# Patient Record
Sex: FEMALE | Race: AMERICAN INDIAN OR ALASKA NATIVE
[De-identification: names, ages, dates, MRNs, and addresses within clinical notes are randomized per-mention and may not be internally consistent; named-entity substitution may affect disease eponyms.]

---

## 2019-01-26 ENCOUNTER — HOSPITAL ENCOUNTER (INPATIENT)
Dept: HOSPITAL 5 - ED | Age: 44
LOS: 6 days | Discharge: PSYCHIATRIC OR SUBSTANCE USE FACILITY | DRG: 917 | End: 2019-02-01
Attending: INTERNAL MEDICINE | Admitting: INTERNAL MEDICINE
Payer: MEDICAID

## 2019-01-26 DIAGNOSIS — K59.00: ICD-10-CM

## 2019-01-26 DIAGNOSIS — F31.9: ICD-10-CM

## 2019-01-26 DIAGNOSIS — G40.89: ICD-10-CM

## 2019-01-26 DIAGNOSIS — E66.9: ICD-10-CM
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
## 2019-01-26 DIAGNOSIS — F41.9: ICD-10-CM

## 2019-01-26 DIAGNOSIS — F43.10: ICD-10-CM
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
## 2019-01-26 DIAGNOSIS — Z90.710: ICD-10-CM

## 2019-01-26 DIAGNOSIS — K21.9: ICD-10-CM

## 2019-01-26 DIAGNOSIS — Z88.6: ICD-10-CM
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
## 2019-01-26 DIAGNOSIS — G89.4: ICD-10-CM

## 2019-01-26 DIAGNOSIS — I10: ICD-10-CM
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
## 2019-01-26 DIAGNOSIS — T50.991A: Primary | ICD-10-CM

## 2019-01-26 DIAGNOSIS — G92: ICD-10-CM

## 2019-01-26 LAB
ALBUMIN SERPL-MCNC: 3.7 G/DL (ref 3.9–5)
ALT SERPL-CCNC: 5 UNITS/L (ref 7–56)
BASOPHILS # (AUTO): 0.1 K/MM3 (ref 0–0.1)
BASOPHILS NFR BLD AUTO: 0.9 % (ref 0–1.8)
BUN SERPL-MCNC: 11 MG/DL (ref 7–17)
BUN/CREAT SERPL: 12 %
CALCIUM SERPL-MCNC: 8.4 MG/DL (ref 8.4–10.2)
EOSINOPHIL # BLD AUTO: 0.1 K/MM3 (ref 0–0.4)
EOSINOPHIL NFR BLD AUTO: 1.9 % (ref 0–4.3)
HCT VFR BLD CALC: 36.8 % (ref 30.3–42.9)
HEMOLYSIS INDEX: 19
HGB BLD-MCNC: 11.7 GM/DL (ref 10.1–14.3)
LYMPHOCYTES # BLD AUTO: 2.3 K/MM3 (ref 1.2–5.4)
LYMPHOCYTES NFR BLD AUTO: 36.4 % (ref 13.4–35)
MCHC RBC AUTO-ENTMCNC: 32 % (ref 30–34)
MCV RBC AUTO: 83 FL (ref 79–97)
MONOCYTES # (AUTO): 0.7 K/MM3 (ref 0–0.8)
MONOCYTES % (AUTO): 11.5 % (ref 0–7.3)
PLATELET # BLD: 205 K/MM3 (ref 140–440)
RBC # BLD AUTO: 4.41 M/MM3 (ref 3.65–5.03)

## 2019-01-26 PROCEDURE — 84703 CHORIONIC GONADOTROPIN ASSAY: CPT

## 2019-01-26 PROCEDURE — G0480 DRUG TEST DEF 1-7 CLASSES: HCPCS

## 2019-01-26 PROCEDURE — 84443 ASSAY THYROID STIM HORMONE: CPT

## 2019-01-26 PROCEDURE — 95819 EEG AWAKE AND ASLEEP: CPT

## 2019-01-26 PROCEDURE — 82550 ASSAY OF CK (CPK): CPT

## 2019-01-26 PROCEDURE — 85027 COMPLETE CBC AUTOMATED: CPT

## 2019-01-26 PROCEDURE — 74018 RADEX ABDOMEN 1 VIEW: CPT

## 2019-01-26 PROCEDURE — 93010 ELECTROCARDIOGRAM REPORT: CPT

## 2019-01-26 PROCEDURE — 36415 COLL VENOUS BLD VENIPUNCTURE: CPT

## 2019-01-26 PROCEDURE — 80164 ASSAY DIPROPYLACETIC ACD TOT: CPT

## 2019-01-26 PROCEDURE — 85025 COMPLETE CBC W/AUTO DIFF WBC: CPT

## 2019-01-26 PROCEDURE — 80053 COMPREHEN METABOLIC PANEL: CPT

## 2019-01-26 PROCEDURE — 81001 URINALYSIS AUTO W/SCOPE: CPT

## 2019-01-26 PROCEDURE — 93005 ELECTROCARDIOGRAM TRACING: CPT

## 2019-01-26 PROCEDURE — 80048 BASIC METABOLIC PNL TOTAL CA: CPT

## 2019-01-26 PROCEDURE — 70450 CT HEAD/BRAIN W/O DYE: CPT

## 2019-01-26 PROCEDURE — 84484 ASSAY OF TROPONIN QUANT: CPT

## 2019-01-26 PROCEDURE — 80320 DRUG SCREEN QUANTALCOHOLS: CPT

## 2019-01-26 PROCEDURE — 82150 ASSAY OF AMYLASE: CPT

## 2019-01-26 PROCEDURE — 83690 ASSAY OF LIPASE: CPT

## 2019-01-26 PROCEDURE — 82553 CREATINE MB FRACTION: CPT

## 2019-01-26 PROCEDURE — 80307 DRUG TEST PRSMV CHEM ANLYZR: CPT

## 2019-01-26 PROCEDURE — 82962 GLUCOSE BLOOD TEST: CPT

## 2019-01-26 NOTE — EMERGENCY DEPARTMENT REPORT
ED Altered Mental Status HPI





- General


Chief Complaint: Altered Mental Status


Stated Complaint: UNRESPONSIVE/POSSIBLE OD


Time Seen by Provider: 01/26/19 19:08


Source: patient


Mode of arrival: Ambulatory


Limitations: Altered Mental Status





- History of Present Illness


Initial Comments: 


She has a 43-year-old female that presents emergency room with complaints of 

decreased responsiveness and lethargy.  Patient was brought in by EMS.  

Patient's family called EMS since the patient was so tired and a decreased level

of responsiveness.  At this time patient is answering all questions 

appropriately but patient is lethargic but arousable.  Patient states that she 

took her psychiatric medications as directed.  She states she did not misuse any

of her medications.  Patient states not over take her medications and took all 

her meds as directed.  Patient states that her psychiatrist recently increased 

her mood stabilizer in her Seroquel.  Patient is unsure of the mood stabilizers 

name.  Patient states that she went up to 1-1/2 tablets of Seroquel and is 

making her tired.  Patient states she did not take extra medications.  Patient 

states she is taking all her medications as directed.  Patient states that the 

new changes to her Seroquel and mood stabilizer just too strong.  She denies 

headache.  Patient denies chest pain.  Patient denies shortness of breath.  She 

complains of being tired and fatigued.





MD Complaint: altered mental status, confusion, decreased responsiveness


-: Sudden


Severity: severe


Consistency of Symptoms: constant


Context: change in medication


Associated Symptoms: malaise, weakness.  denies: chest pain, cough, diaphoresis,

fever/chills, headaches, loss of appetite, nausea/vomiting, rash, seizure, 

shortness of breath, syncope, foul smelling urine, difficulty walking, diarrhea,

incontinence





- Related Data


                                Home Medications











 Medication  Instructions  Recorded  Confirmed  Last Taken


 


Calcium Carbonate [Calcium] 1 tab PO DAILY 06/12/14 11/22/14 11/21/14


 


Lisinopril 10 mg PO Q48HR 06/12/14 11/22/14 11/21/14


 


Multivitamin [Multi-Vitamin Daily] 1 tab PO DAILY 06/12/14 11/22/14 11/21/14


 


Cyclobenzaprine [Flexeril 10mg] 10 mg PO BID 11/22/14 11/22/14 11/21/14








                                  Previous Rx's











 Medication  Instructions  Recorded  Last Taken  Type


 


oxyCODONE /ACETAMINOPHEN [Percocet 1 - 2 tab PO Q4H PRN #30 tablet 06/20/14 11/ 21/14 Rx





5/325 mg]    


 


Acetaminophen/Codeine [Tylenol #3] 1 tab PO Q6H PRN #20 tab 06/02/16 Unknown Rx


 


Ibuprofen [Motrin] 600 mg PO Q8H PRN #40 tablet 06/02/16 Unknown Rx


 


Dicyclomine [Bentyl] 10 mg PO QID PRN #12 capsule 11/03/17 Unknown Rx


 


Lisinopril [Zestril TAB] 10 mg PO QDAY #30 tablet 11/03/17 Unknown Rx


 


Ondansetron [Zofran TAB] 4 mg PO Q8HR PRN #12 tablet 11/03/17 Unknown Rx











                                    Allergies











Allergy/AdvReac Type Severity Reaction Status Date / Time


 


tramadol Allergy  Angioedema Verified 06/01/16 23:25














ED Review of Systems


ROS: 


Stated complaint: UNRESPONSIVE/POSSIBLE OD


Other details as noted in HPI





Constitutional: denies: chills, fever


Eyes: denies: eye pain, eye discharge, vision change


ENT: denies: ear pain, throat pain


Respiratory: denies: cough, shortness of breath, wheezing


Cardiovascular: denies: chest pain, palpitations


Endocrine: no symptoms reported


Gastrointestinal: denies: abdominal pain, nausea, diarrhea


Genitourinary: denies: urgency, dysuria, discharge


Musculoskeletal: denies: back pain, joint swelling, arthralgia


Skin: denies: rash, lesions


Neurological: denies: headache, weakness, paresthesias


Psychiatric: denies: anxiety, depression, auditory hallucinations, visual 

hallucinations, homicidal thoughts, suicidal thoughts


Hematological/Lymphatic: denies: easy bleeding, easy bruising





ED Past Medical Hx





- Past Medical History


Previous Medical History?: Yes


Hx Hypertension: Yes (x 3 yrs)


Hx Congestive Heart Failure: No


Hx Diabetes: No


Hx GERD: Yes


Hx Psychiatric Treatment: Yes (BIPOLAR/PTSD)


Hx Asthma: No


Hx COPD: No


Additional medical history: Anemia, heavy menses, hypokalemia, bulging disc in 

lower back





- Surgical History


Past Surgical History?: Yes


Additional Surgical History: lap band,HYSTERECTOMY





- Family History


Family history: no significant





- Social History


Smoking Status: Never Smoker


Substance Use Type: None





- Medications


Home Medications: 


                                Home Medications











 Medication  Instructions  Recorded  Confirmed  Last Taken  Type


 


Calcium Carbonate [Calcium] 1 tab PO DAILY 06/12/14 11/22/14 11/21/14 History


 


Lisinopril 10 mg PO Q48HR 06/12/14 11/22/14 11/21/14 History


 


Multivitamin [Multi-Vitamin Daily] 1 tab PO DAILY 06/12/14 11/22/14 11/21/14 

History


 


oxyCODONE /ACETAMINOPHEN [Percocet 1 - 2 tab PO Q4H PRN #30 tablet 06/20/14 11/22/14 11/21/14 Rx





5/325 mg]     


 


Cyclobenzaprine [Flexeril 10mg] 10 mg PO BID 11/22/14 11/22/14 11/21/14 History


 


Acetaminophen/Codeine [Tylenol #3] 1 tab PO Q6H PRN #20 tab 06/02/16  Unknown Rx


 


Ibuprofen [Motrin] 600 mg PO Q8H PRN #40 tablet 06/02/16  Unknown Rx


 


Dicyclomine [Bentyl] 10 mg PO QID PRN #12 capsule 11/03/17  Unknown Rx


 


Lisinopril [Zestril TAB] 10 mg PO QDAY #30 tablet 11/03/17  Unknown Rx


 


Ondansetron [Zofran TAB] 4 mg PO Q8HR PRN #12 tablet 11/03/17  Unknown Rx














ED Physical Exam





- General


Limitations: Altered Mental Status


General appearance: alert, in no apparent distress, lethargic (but arousable)





- Head


Head exam: Present: atraumatic, normocephalic





- Eye


Eye exam: Present: normal appearance, PERRL, EOMI


Pupils: Present: normal accommodation





- ENT


ENT exam: Present: mucous membranes moist





- Neck


Neck exam: Present: normal inspection





- Respiratory


Respiratory exam: Present: normal lung sounds bilaterally.  Absent: respiratory 

distress, wheezes, rales, rhonchi, stridor





- Cardiovascular


Cardiovascular Exam: Present: regular rate, normal rhythm.  Absent: systolic 

murmur, diastolic murmur, rubs, gallop





- GI/Abdominal


GI/Abdominal exam: Present: soft, normal bowel sounds.  Absent: distended, ten

derness, guarding, rebound





- Extremities Exam


Extremities exam: Present: normal inspection





- Back Exam


Back exam: Present: normal inspection





- Neurological Exam


Neurological exam: Present: alert, oriented X3





- Skin


Skin exam: Present: warm, dry, intact, normal color.  Absent: rash





- Assessment


Assessment Interval: Baseline





- Level of Consciousness


1a. Level of Consciousness: alert/keenly responsive





- LOC Questions


1b. LOC Questions: answers both correctly





- LOC Command


1c. LOC Commands: performs tasks correctly





- Best Gaze


2. Best Gaze: normal





- Visual


3. Visual: no visual loss





- Facial Palsy


4. Facial Palsy: normal symmetrical movement





- Motor Arm


5b. Motor Arm Right: no drift


5a. Motor Arm Left: no drift





- Motor Leg


6b. Motor Leg Right: no drift


6a. Motor Leg Left: no drift





- Limb Ataxia


7. Limb Ataxia: absent





- Sensory


8. Sensory: normal





- Best Language


9. Best Language: no aphasia





- Dysarthria


10. Dysarthria: normal





- Extinction and Inattention


11. Extinction/Inattention: no abnormality





- Scoring


Total Score: 0


Stroke Severity: No Stroke Symptoms





ED Course


                                   Vital Signs











  01/26/19 01/26/19 01/26/19





  18:19 18:30 18:35


 


Temperature  97.9 F 


 


Pulse Rate 60 55 L 


 


Respiratory 13 14 14





Rate   


 


Blood Pressure  117/74 


 


O2 Sat by Pulse 99 100 100





Oximetry   














  01/26/19 01/26/19 01/26/19





  18:45 19:00 19:16


 


Temperature   


 


Pulse Rate 58 L 58 L 


 


Respiratory 16 15 





Rate   


 


Blood Pressure 116/74 116/74 134/90


 


O2 Sat by Pulse 98 99 100





Oximetry   














  01/26/19 01/26/19 01/26/19





  19:30 19:46 20:00


 


Temperature   


 


Pulse Rate  59 L 61


 


Respiratory  14 15





Rate   


 


Blood Pressure 134/90 134/90 134/90


 


O2 Sat by Pulse 98 100 99





Oximetry   














  01/26/19 01/26/19 01/26/19





  20:16 20:30 20:45


 


Temperature   


 


Pulse Rate 59 L 58 L 61


 


Respiratory 15 16 14





Rate   


 


Blood Pressure 134/90 108/69 112/69


 


O2 Sat by Pulse 98 99 98





Oximetry   














  01/26/19 01/26/19 01/26/19





  21:00 21:16 21:30


 


Temperature   


 


Pulse Rate 61 62 65


 


Respiratory 14 15 15





Rate   


 


Blood Pressure 104/71 106/70 110/74


 


O2 Sat by Pulse 99 99 98





Oximetry   














  01/26/19 01/26/19 01/26/19





  21:45 22:00 22:15


 


Temperature   


 


Pulse Rate 57 L 58 L 59 L


 


Respiratory 15 14 13





Rate   


 


Blood Pressure 112/62 110/63 96/58


 


O2 Sat by Pulse 99 97 97





Oximetry   














- Reevaluation(s)


Reevaluation #1: 


Evaluation done.  Patient states that she took her meds as directed but hadn't 

med changes recently and is causing her to be overly tired.  Patient is let

hargic but arousable and answers all questions appropriately.


01/26/19 19:00








She is still lethargic but  becoming more arousable.


01/26/19 23:34








Patient still lethargic but arousable.  Patient very drowsy.  Patient unable to 

stand up.  The patient will be admitted to the hospitalist service.  Patient and

family agrees with plan of care.


01/27/19 01:25











- Consultations


Consultation #1: 


Hospitalist consult for admission.  Hospitalist to admit patient.


01/27/19 01:30











- Lab Data


Result diagrams: 


                                 01/26/19 18:56





                                 01/26/19 18:56


                                   Lab Results











  01/26/19 01/26/19 01/26/19 Range/Units





  18:56 18:56 18:56 


 


WBC  6.3    (4.5-11.0)  K/mm3


 


RBC  4.41    (3.65-5.03)  M/mm3


 


Hgb  11.7    (10.1-14.3)  gm/dl


 


Hct  36.8    (30.3-42.9)  %


 


MCV  83    (79-97)  fl


 


MCH  27 L    (28-32)  pg


 


MCHC  32    (30-34)  %


 


RDW  14.0    (13.2-15.2)  %


 


Plt Count  205    (140-440)  K/mm3


 


Lymph % (Auto)  36.4 H    (13.4-35.0)  %


 


Mono % (Auto)  11.5 H    (0.0-7.3)  %


 


Eos % (Auto)  1.9    (0.0-4.3)  %


 


Baso % (Auto)  0.9    (0.0-1.8)  %


 


Lymph #  2.3    (1.2-5.4)  K/mm3


 


Mono #  0.7    (0.0-0.8)  K/mm3


 


Eos #  0.1    (0.0-0.4)  K/mm3


 


Baso #  0.1    (0.0-0.1)  K/mm3


 


Seg Neutrophils %  49.3    (40.0-70.0)  %


 


Seg Neutrophils #  3.1    (1.8-7.7)  K/mm3


 


Sodium   142   (137-145)  mmol/L


 


Potassium   4.3   (3.6-5.0)  mmol/L


 


Chloride   106.4   ()  mmol/L


 


Carbon Dioxide   26   (22-30)  mmol/L


 


Anion Gap   14   mmol/L


 


BUN   11   (7-17)  mg/dL


 


Creatinine   0.9   (0.7-1.2)  mg/dL


 


Estimated GFR   > 60   ml/min


 


BUN/Creatinine Ratio   12   %


 


Glucose   86   ()  mg/dL


 


Calcium   8.4   (8.4-10.2)  mg/dL


 


Total Bilirubin   0.30   (0.1-1.2)  mg/dL


 


AST   11   (5-40)  units/L


 


ALT   5 L   (7-56)  units/L


 


Alkaline Phosphatase   62   ()  units/L


 


Total Protein   6.6   (6.3-8.2)  g/dL


 


Albumin   3.7 L   (3.9-5)  g/dL


 


Albumin/Globulin Ratio   1.3   %


 


TSH    2.460  (0.270-4.200)  mlU/mL


 


Urine Color     (Yellow)  


 


Urine Turbidity     (Clear)  


 


Urine pH     (5.0-7.0)  


 


Ur Specific Gravity     (1.003-1.030)  


 


Urine Protein     (Negative)  mg/dL


 


Urine Glucose (UA)     (Negative)  mg/dL


 


Urine Ketones     (Negative)  mg/dL


 


Urine Blood     (Negative)  


 


Urine Nitrite     (Negative)  


 


Urine Bilirubin     (Negative)  


 


Urine Urobilinogen     (<2.0)  mg/dL


 


Ur Leukocyte Esterase     (Negative)  


 


Urine WBC (Auto)     (0.0-6.0)  /HPF


 


Urine RBC (Auto)     (0.0-6.0)  /HPF


 


Urine Mucus     /HPF


 


Salicylates     (2.8-20.0)  mg/dL


 


Urine Opiates Screen     


 


Urine Methadone Screen     


 


Acetaminophen     (10.0-30.0)  ug/mL


 


Ur Barbiturates Screen     


 


Ur Phencyclidine Scrn     


 


Ur Amphetamines Screen     


 


U Benzodiazepines Scrn     


 


Urine Cocaine Screen     


 


U Marijuana (THC) Screen     


 


Drugs of Abuse Note     


 


Plasma/Serum Alcohol     (0-0.07)  %














  01/26/19 01/26/19 01/26/19 Range/Units





  18:56 19:38 19:38 


 


WBC     (4.5-11.0)  K/mm3


 


RBC     (3.65-5.03)  M/mm3


 


Hgb     (10.1-14.3)  gm/dl


 


Hct     (30.3-42.9)  %


 


MCV     (79-97)  fl


 


MCH     (28-32)  pg


 


MCHC     (30-34)  %


 


RDW     (13.2-15.2)  %


 


Plt Count     (140-440)  K/mm3


 


Lymph % (Auto)     (13.4-35.0)  %


 


Mono % (Auto)     (0.0-7.3)  %


 


Eos % (Auto)     (0.0-4.3)  %


 


Baso % (Auto)     (0.0-1.8)  %


 


Lymph #     (1.2-5.4)  K/mm3


 


Mono #     (0.0-0.8)  K/mm3


 


Eos #     (0.0-0.4)  K/mm3


 


Baso #     (0.0-0.1)  K/mm3


 


Seg Neutrophils %     (40.0-70.0)  %


 


Seg Neutrophils #     (1.8-7.7)  K/mm3


 


Sodium     (137-145)  mmol/L


 


Potassium     (3.6-5.0)  mmol/L


 


Chloride     ()  mmol/L


 


Carbon Dioxide     (22-30)  mmol/L


 


Anion Gap     mmol/L


 


BUN     (7-17)  mg/dL


 


Creatinine     (0.7-1.2)  mg/dL


 


Estimated GFR     ml/min


 


BUN/Creatinine Ratio     %


 


Glucose     ()  mg/dL


 


Calcium     (8.4-10.2)  mg/dL


 


Total Bilirubin     (0.1-1.2)  mg/dL


 


AST     (5-40)  units/L


 


ALT     (7-56)  units/L


 


Alkaline Phosphatase     ()  units/L


 


Total Protein     (6.3-8.2)  g/dL


 


Albumin     (3.9-5)  g/dL


 


Albumin/Globulin Ratio     %


 


TSH     (0.270-4.200)  mlU/mL


 


Urine Color     (Yellow)  


 


Urine Turbidity     (Clear)  


 


Urine pH     (5.0-7.0)  


 


Ur Specific Gravity     (1.003-1.030)  


 


Urine Protein     (Negative)  mg/dL


 


Urine Glucose (UA)     (Negative)  mg/dL


 


Urine Ketones     (Negative)  mg/dL


 


Urine Blood     (Negative)  


 


Urine Nitrite     (Negative)  


 


Urine Bilirubin     (Negative)  


 


Urine Urobilinogen     (<2.0)  mg/dL


 


Ur Leukocyte Esterase     (Negative)  


 


Urine WBC (Auto)     (0.0-6.0)  /HPF


 


Urine RBC (Auto)     (0.0-6.0)  /HPF


 


Urine Mucus     /HPF


 


Salicylates   < 0.3 L   (2.8-20.0)  mg/dL


 


Urine Opiates Screen     


 


Urine Methadone Screen     


 


Acetaminophen    < 5.0 L  (10.0-30.0)  ug/mL


 


Ur Barbiturates Screen     


 


Ur Phencyclidine Scrn     


 


Ur Amphetamines Screen     


 


U Benzodiazepines Scrn     


 


Urine Cocaine Screen     


 


U Marijuana (THC) Screen     


 


Drugs of Abuse Note     


 


Plasma/Serum Alcohol  < 0.01    (0-0.07)  %














  01/26/19 01/26/19 Range/Units





  23:51 23:51 


 


WBC    (4.5-11.0)  K/mm3


 


RBC    (3.65-5.03)  M/mm3


 


Hgb    (10.1-14.3)  gm/dl


 


Hct    (30.3-42.9)  %


 


MCV    (79-97)  fl


 


MCH    (28-32)  pg


 


MCHC    (30-34)  %


 


RDW    (13.2-15.2)  %


 


Plt Count    (140-440)  K/mm3


 


Lymph % (Auto)    (13.4-35.0)  %


 


Mono % (Auto)    (0.0-7.3)  %


 


Eos % (Auto)    (0.0-4.3)  %


 


Baso % (Auto)    (0.0-1.8)  %


 


Lymph #    (1.2-5.4)  K/mm3


 


Mono #    (0.0-0.8)  K/mm3


 


Eos #    (0.0-0.4)  K/mm3


 


Baso #    (0.0-0.1)  K/mm3


 


Seg Neutrophils %    (40.0-70.0)  %


 


Seg Neutrophils #    (1.8-7.7)  K/mm3


 


Sodium    (137-145)  mmol/L


 


Potassium    (3.6-5.0)  mmol/L


 


Chloride    ()  mmol/L


 


Carbon Dioxide    (22-30)  mmol/L


 


Anion Gap    mmol/L


 


BUN    (7-17)  mg/dL


 


Creatinine    (0.7-1.2)  mg/dL


 


Estimated GFR    ml/min


 


BUN/Creatinine Ratio    %


 


Glucose    ()  mg/dL


 


Calcium    (8.4-10.2)  mg/dL


 


Total Bilirubin    (0.1-1.2)  mg/dL


 


AST    (5-40)  units/L


 


ALT    (7-56)  units/L


 


Alkaline Phosphatase    ()  units/L


 


Total Protein    (6.3-8.2)  g/dL


 


Albumin    (3.9-5)  g/dL


 


Albumin/Globulin Ratio    %


 


TSH    (0.270-4.200)  mlU/mL


 


Urine Color  Blossom   (Yellow)  


 


Urine Turbidity  Clear   (Clear)  


 


Urine pH  6.0   (5.0-7.0)  


 


Ur Specific Gravity  1.028   (1.003-1.030)  


 


Urine Protein  30 mg/dl   (Negative)  mg/dL


 


Urine Glucose (UA)  Neg   (Negative)  mg/dL


 


Urine Ketones  Tr   (Negative)  mg/dL


 


Urine Blood  Neg   (Negative)  


 


Urine Nitrite  Neg   (Negative)  


 


Urine Bilirubin  Neg   (Negative)  


 


Urine Urobilinogen  4.0   (<2.0)  mg/dL


 


Ur Leukocyte Esterase  Neg   (Negative)  


 


Urine WBC (Auto)  < 1.0   (0.0-6.0)  /HPF


 


Urine RBC (Auto)  4.0   (0.0-6.0)  /HPF


 


Urine Mucus  Few   /HPF


 


Salicylates    (2.8-20.0)  mg/dL


 


Urine Opiates Screen   Presumptive negative  


 


Urine Methadone Screen   Presumptive positive  


 


Acetaminophen    (10.0-30.0)  ug/mL


 


Ur Barbiturates Screen   Presumptive negative  


 


Ur Phencyclidine Scrn   Presumptive negative  


 


Ur Amphetamines Screen   Presumptive negative  


 


U Benzodiazepines Scrn   Presumptive positive  


 


Urine Cocaine Screen   Presumptive negative  


 


U Marijuana (THC) Screen   Presumptive negative  


 


Drugs of Abuse Note   Disclamer  


 


Plasma/Serum Alcohol    (0-0.07)  %














- EKG Data


-: EKG Interpreted by Me


EKG shows normal: sinus rhythm, axis, intervals, QRS complexes, ST-T waves


Rate: bradycardia





- Radiology Data


Radiology results: report reviewed


CT head negative





- Medical Decision Making


Patient is a 43-year-old female presents to emergency room with possible 

overdose, lethargy, altered mental status and decreased responsiveness.  Patient

denied suicidal or homicidal ideation.  Patient denies misuse of her 

medications.  Patient states recently she had an adjustment of her Seroquel and 

a mood stabilizer added.  Patient labs unremarkable.  Patient's CT of the head 

unremarkable.  Patient's clinical findings are consistent with a med reaction 

and med interaction causing altered mental status and decreased responsiveness 

and lethargy.  Patient to be admitted to the hospitalist service for further 

evaluation and treatment.  All findings consistent with medication reaction and 

toxic encephalopathy.








- Differential Diagnosis


med reaction or interation. ams. lethergy


Critical Care Time: Yes


Critical care attestation.: 


If time is entered above; I have spent that time in minutes in the direct care 

of this critically ill patient, excluding procedure time.





Critical Care Time: 





55 minutes





ED Disposition


Clinical Impression: 


 Decreased responsiveness, Toxic encephalopathy





Overdose


Qualifiers:


 Encounter type: initial encounter Injury intent: accidental or unintentional 

Qualified Code(s): T50.901A - Poisoning by unspecified drugs, medicaments and 

biological substances, accidental (unintentional), initial encounter





Medication reaction


Qualifiers:


 Encounter type: initial encounter Qualified Code(s): T50.905A - Adverse effect 

of unspecified drugs, medicaments and biological substances, initial encounter





Altered mental state


Qualifiers:


 Altered mental status type: unspecified Qualified Code(s): R41.82 - Altered 

mental status, unspecified





Disposition: DC-09 OP ADMIT IP TO THIS HOSP


Is pt being admited?: Yes


Does the pt Need Aspirin: No


Condition: Critical


Time of Disposition: 01:29

## 2019-01-27 LAB
BENZODIAZEPINES SCREEN,URINE: (no result)
BILIRUB UR QL STRIP: (no result)
BLOOD UR QL VISUAL: (no result)
METHADONE SCREEN,URINE: (no result)
MUCOUS THREADS #/AREA URNS HPF: (no result) /HPF
OPIATE SCREEN,URINE: (no result)
PH UR STRIP: 6 [PH] (ref 5–7)
RBC #/AREA URNS HPF: 4 /HPF (ref 0–6)
UROBILINOGEN UR-MCNC: 4 MG/DL (ref ?–2)
WBC #/AREA URNS HPF: < 1 /HPF (ref 0–6)

## 2019-01-27 RX ADMIN — HYDROXYZINE HYDROCHLORIDE SCH MG: 25 TABLET, FILM COATED ORAL at 22:14

## 2019-01-27 RX ADMIN — ACETAMINOPHEN PRN MG: 325 TABLET ORAL at 17:56

## 2019-01-27 RX ADMIN — AMLODIPINE BESYLATE SCH: 5 TABLET ORAL at 12:18

## 2019-01-27 RX ADMIN — ONDANSETRON PRN MG: 2 INJECTION INTRAMUSCULAR; INTRAVENOUS at 12:22

## 2019-01-27 RX ADMIN — HEPARIN SODIUM SCH UNIT: 5000 INJECTION, SOLUTION INTRAVENOUS; SUBCUTANEOUS at 09:09

## 2019-01-27 RX ADMIN — ACETAMINOPHEN PRN MG: 325 TABLET ORAL at 09:07

## 2019-01-27 RX ADMIN — HEPARIN SODIUM SCH UNIT: 5000 INJECTION, SOLUTION INTRAVENOUS; SUBCUTANEOUS at 22:16

## 2019-01-27 NOTE — PROGRESS NOTE
Assessment and Plan





Acute toxic encephalopathy


- likely from drug overdose 


- psych consult





Pseudoseizure


- episode this am is not typical for partial seizure ( symptom improved with D50

?) 


- will get EEG, cont to monitor for now





Bipolar with PTSD


- resume home meds, follow psych recommendation





obesity, will  when more communicative








Chronic pain syndrome


- will verify home meds with pharmacy before starting








DVt Px, lovenox





Brief History: 43-year-old AA female with h/o bipolar, PTSD, chronic pain 

presented to the ER for decreased responsiveness and being lethargic.





Subjective


Date of service: 01/27/19


Interval history: 





Pt seen and examined


called code med this am


Patient noted to have repetitive side by side right hand movement, pateint was a

lert and awake and was able to answer question during that episode


her BG was at 70s, given one ampule of d50 and her hand movement slowly resolved


Family was at bedside, updated. had no tongue bite, no LOC 





Objective





- Constitutional


Vitals: 


                               Vital Signs - 12hr











  01/26/19 01/26/19 01/26/19





  23:20 23:30 23:40


 


Temperature   


 


Pulse Rate 61 59 L 64


 


Respiratory 15 13 16





Rate   


 


Blood Pressure 108/63 107/63 107/63


 


Blood Pressure   





[Left]   


 


O2 Sat by Pulse 98 98 98





Oximetry   














  01/26/19 01/27/19 01/27/19





  23:50 00:00 00:10


 


Temperature   


 


Pulse Rate 72 59 L 61


 


Respiratory 11 L 15 14





Rate   


 


Blood Pressure 115/75 113/73 113/73


 


Blood Pressure   





[Left]   


 


O2 Sat by Pulse 99 99 99





Oximetry   














  01/27/19 01/27/19 01/27/19





  00:34 04:43 04:45


 


Temperature   


 


Pulse Rate 63  61


 


Respiratory 18  18





Rate   


 


Blood Pressure  95/58 


 


Blood Pressure 106/60  105/63





[Left]   


 


O2 Sat by Pulse 100  98





Oximetry   














  01/27/19 01/27/19 01/27/19





  05:06 07:07 07:08


 


Temperature 98.0 F 98.9 F 98.9 F


 


Pulse Rate 66 67 


 


Respiratory 18 20 20





Rate   


 


Blood Pressure 114/73 124/80 


 


Blood Pressure   





[Left]   


 


O2 Sat by Pulse 98 99 





Oximetry   














  01/27/19 01/27/19





  08:23 09:07


 


Temperature  


 


Pulse Rate  


 


Respiratory 17 18





Rate  


 


Blood Pressure  


 


Blood Pressure  





[Left]  


 


O2 Sat by Pulse  





Oximetry  











General appearance: Present: well-nourished, other (les communicative)





- EENT


Eyes: PERRL, EOM intact


ENT: hearing intact, clear oral mucosa


Ears: bilateral: normal





- Neck


Neck: supple, normal ROM





- Respiratory


Respiratory effort: normal


Respiratory: bilateral: CTA





- Cardiovascular


Rhythm: regular


Heart Sounds: Present: S1 & S2.  Absent: gallop, rub


Extremities: pulses intact, No edema, normal color, Full ROM





- Gastrointestinal


General gastrointestinal: Present: soft, non-tender, non-distended, normal bowel

 sounds





- Integumentary


Integumentary: clear, warm, dry





- Musculoskeletal


Musculoskeletal: 1, strength equal bilaterally





- Neurologic


Neurologic: moves all extremities





- Psychiatric


Psychiatric: no appropriate mood/affect, other (falt affect, anxious)





- Labs


CBC & Chem 7: 


                                 01/26/19 18:56





                                 01/26/19 18:56


Labs: 


                              Abnormal lab results











  01/26/19 01/26/19 01/26/19 Range/Units





  18:56 18:56 19:38 


 


MCH  27 L    (28-32)  pg


 


Lymph % (Auto)  36.4 H    (13.4-35.0)  %


 


Mono % (Auto)  11.5 H    (0.0-7.3)  %


 


ALT   5 L   (7-56)  units/L


 


Albumin   3.7 L   (3.9-5)  g/dL


 


Salicylates    < 0.3 L  (2.8-20.0)  mg/dL


 


Acetaminophen     (10.0-30.0)  ug/mL














  01/26/19 Range/Units





  19:38 


 


MCH   (28-32)  pg


 


Lymph % (Auto)   (13.4-35.0)  %


 


Mono % (Auto)   (0.0-7.3)  %


 


ALT   (7-56)  units/L


 


Albumin   (3.9-5)  g/dL


 


Salicylates   (2.8-20.0)  mg/dL


 


Acetaminophen  < 5.0 L  (10.0-30.0)  ug/mL














- Imaging and cardiology


Abdominal x-ray: report reviewed


CT Scan - head: report reviewed

## 2019-01-27 NOTE — HISTORY AND PHYSICAL REPORT
CHIEF COMPLAINT:  Change in mental status.



HISTORY OF PRESENT ILLNESS:  The patient is a 43-year-old female brought to the

Emergency Room by EMS after family called and complained that the patient has

had mental status change with decreased responsiveness.  The patient at the

Emergency Room was able to answer questions, but was found to be lethargic and

arousable and stated that she took all her medications as I prescribed; however,

she noted that psychiatric changed her medications and introduced some new mood

stabilizer and increased her Seroquel dose and since then she has been feeling

tired and sleepy, but said that she did not take any extra medications or abuse

any medication.  There is no history of chest pain and no history of shortness

of breath, fever, or chills.  The patient was presented for admission.



PAST MEDICAL HISTORY:  Pertinent for hypertension, gastroesophageal reflux

disease, bipolar disorder, and posttraumatic stress disorder.  Also, the patient

has past history of anemia, heavy menstruation, hypokalemia, bulging disk in the

back.



PAST SURGICAL HISTORY:  Pertinent for laparoscopic band surgery as well as

hysterectomy.



FAMILY HISTORY:  Noncontributory.



SOCIAL HISTORY:  The patient does not smoke, does not drink alcohol, and does

not use illicit drugs.



MEDICATIONS:  The patient is on Seroquel, dose and frequency unknown and some

mood stabilizer with name known.  Previously, the patient was taking blood

pressure medication lisinopril 10 mg every 48 hours, but it is not known whether

the patient is taking this at current time.



ALLERGIES:  THE PATIENT IS ALLERGIC TO TRAMADOL.



REVIEW OF SYSTEMS:

CONSTITUTIONAL:  There is no fever, no chills, no diaphoresis.

HEENT:  There is no headache or sore throat.

CARDIOVASCULAR:  There is no chest pain or orthopnea.

RESPIRATORY SYSTEM:  There is no shortness of breath or cough.

GASTROINTESTINAL SYSTEM:  There is no nausea, no vomiting, no abdominal pain,

diarrhea, or constipation.

NEUROLOGICAL SYSTEM:  Change in mental status noted, lethargy noted, and

decreased responsiveness noted.

MUSCULOSKELETAL SYSTEM:  There is no joint pain or swelling.

DERMATOLOGICAL SYSTEM:  There is no skin rash or itching.

GENITOURINARY SYSTEM:  There is no dysuria, hematuria, or flank pain.

Rest of system review is normal.



PHYSICAL EXAMINATION:

GENERAL:  At the time of exam, the patient was found to be lethargic, arousable,

able to answer questions and not in acute distress.

VITAL SIGNS:  Shows temperature of 98 degrees Fahrenheit, pulse of 61,

respirations 14, blood pressure 113/73, O2 sat of 99% on room air.

HEENT:  Showed pupils to be equal, round, reactive to light and accommodating. 

Extraocular muscles are intact.

NECK:  Supple with no JVD or carotid bruit.

CARDIOVASCULAR SYSTEM:  Show normal first and second heart sounds with no

gallops or murmurs.

RESPIRATORY SYSTEM:  Show good air entry on both sides of the lung with no

abnormal breath sounds.

GASTROINTESTINAL SYSTEM:  Show abdomen to be full, soft, nontender with no

organomegaly or rigidity.

NEUROLOGICAL:  Shows the patient to be lethargic, but arousable, with no focal

neurologic deficits.

MUSCULOSKELETAL SYSTEM:  Show no joint swelling or tenderness.

DERMATOLOGICAL SYSTEM:  Show no skin rash.

GENITOURINARY SYSTEM:  Showing no costovertebral angle tenderness.



PERTINENT LABORATORY AND IMAGING STUDIES:  The patient had CT of the head

without contrast done that shows no acute intracranial lesion.  The patient's

lab result shows CBC with normal white count, normal hemoglobin, and normal

hematocrit with CBC differential showing slightly elevated lymphocytes count of

36.4% and slightly elevated monocytes count of 11.5.  The patient's chemistry

was unremarkable.  Urinalysis was unremarkable.  The patient's toxicology screen

showed positive result for urine methadone and benzodiazepine.  The patient's

alcohol level was unremarkable.



DIAGNOSIS:  Altered mental status.



PLAN OF ACTION:

1.  The patient will be placed on observation on telemetry.

2.  The patient will have cardiac enzymes involving troponin and CPK checked

serial every 6 hours 2 more levels. 

3.  The patient will be on p.r.n. medications like Tylenol 650 mg by mouth every

4 hours for fever and headache and IV Zofran 4 mg every 8 hours for nausea and

vomiting.  The patient will also be on heparin 5000 units subcutaneous q. 12

hours for DVT prophylaxis.

4.  The patient will be on oxygen by nasal cannula 2 L per minute.

5.  The patient's diet will be 2 g sodium diet.





DD: 01/27/2019 05:30

DT: 01/27/2019 08:04

JOB# [id number]  [id number]

OCN/NTS

## 2019-01-28 RX ADMIN — HYDROXYZINE HYDROCHLORIDE SCH MG: 25 TABLET, FILM COATED ORAL at 22:13

## 2019-01-28 RX ADMIN — OXYCODONE AND ACETAMINOPHEN PRN TAB: 5; 325 TABLET ORAL at 19:09

## 2019-01-28 RX ADMIN — HEPARIN SODIUM SCH UNIT: 5000 INJECTION, SOLUTION INTRAVENOUS; SUBCUTANEOUS at 22:12

## 2019-01-28 RX ADMIN — HYDROXYZINE HYDROCHLORIDE SCH MG: 25 TABLET, FILM COATED ORAL at 09:16

## 2019-01-28 RX ADMIN — ACETAMINOPHEN PRN MG: 325 TABLET ORAL at 15:37

## 2019-01-28 RX ADMIN — HEPARIN SODIUM SCH UNIT: 5000 INJECTION, SOLUTION INTRAVENOUS; SUBCUTANEOUS at 09:16

## 2019-01-28 RX ADMIN — GABAPENTIN SCH MG: 400 CAPSULE ORAL at 22:12

## 2019-01-28 RX ADMIN — AMLODIPINE BESYLATE SCH MG: 5 TABLET ORAL at 09:16

## 2019-01-28 RX ADMIN — TRAZODONE HYDROCHLORIDE PRN MG: 50 TABLET ORAL at 22:12

## 2019-01-28 NOTE — PROGRESS NOTE
Assessment and Plan





Acute toxic encephalopathy


- likely from drug overdose 


- psych following





Pseudoseizure


- episode on 01/27/19 am is not typical for partial seizure ( symptom improved 

with D50 ?) 


- will get EEG when pt can cooperates, cont to monitor for now





Bipolar with PTSD


- resumed home meds and being adjusted now, appreciate psych recommendation





obesity, will  when more communicative








Chronic pain syndrome


- verified home meds with pharmacy, will resume home regimen 








DVt Px, lovenox





Brief History: 43-year-old AA female with h/o bipolar, PTSD, chronic pain 

presented to the ER for decreased responsiveness and being lethargic.





Physical exam:





General appearance: Present: well-nourished, other (les communicative)





- EENT


Eyes: PERRL, EOM intact


ENT: hearing intact, clear oral mucosa


Ears: bilateral: normal





- Neck


Neck: supple, normal ROM





- Respiratory


Respiratory effort: normal


Respiratory: bilateral: CTA





- Cardiovascular


Rhythm: regular


Heart Sounds: Present: S1 & S2.  Absent: gallop, rub


Extremities: pulses intact, No edema, normal color, Full ROM





- Gastrointestinal


General gastrointestinal: Present: soft, non-tender, non-distended, normal bowel

sounds





- Integumentary


Integumentary: clear, warm, dry





- Musculoskeletal


Musculoskeletal: 1, strength equal bilaterally





- Neurologic


Neurologic: moves all extremities





- Psychiatric


Psychiatric: no appropriate mood/affect, other (falt affect, anxious)








Subjective


Date of service: 01/28/19


Interval history: 





Pt seen and examined


No acute event O/N


Family at bedside, on 1013





Objective





- Constitutional


Vitals: 


                               Vital Signs - 12hr











  01/28/19





  08:27


 


Temperature 98.4 F


 


Pulse Rate 65


 


Respiratory 16





Rate 


 


Blood Pressure 107/67


 


O2 Sat by Pulse 96





Oximetry 














- Labs


CBC & Chem 7: 


                                 01/26/19 18:56





                                 01/26/19 18:56
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
## 2019-01-29 RX ADMIN — GABAPENTIN SCH MG: 400 CAPSULE ORAL at 21:10

## 2019-01-29 RX ADMIN — ONDANSETRON PRN MG: 2 INJECTION INTRAMUSCULAR; INTRAVENOUS at 16:31

## 2019-01-29 RX ADMIN — HEPARIN SODIUM SCH UNIT: 5000 INJECTION, SOLUTION INTRAVENOUS; SUBCUTANEOUS at 09:24

## 2019-01-29 RX ADMIN — AMLODIPINE BESYLATE SCH MG: 5 TABLET ORAL at 09:23

## 2019-01-29 RX ADMIN — TRAZODONE HYDROCHLORIDE PRN MG: 50 TABLET ORAL at 21:12

## 2019-01-29 RX ADMIN — ONDANSETRON PRN MG: 2 INJECTION INTRAMUSCULAR; INTRAVENOUS at 09:26

## 2019-01-29 RX ADMIN — GABAPENTIN SCH MG: 400 CAPSULE ORAL at 09:23

## 2019-01-29 RX ADMIN — HEPARIN SODIUM SCH UNIT: 5000 INJECTION, SOLUTION INTRAVENOUS; SUBCUTANEOUS at 21:12

## 2019-01-29 RX ADMIN — PANTOPRAZOLE SODIUM SCH MG: 40 TABLET, DELAYED RELEASE ORAL at 21:10

## 2019-01-29 RX ADMIN — GABAPENTIN SCH MG: 400 CAPSULE ORAL at 13:22

## 2019-01-29 RX ADMIN — HYDROXYZINE HYDROCHLORIDE SCH MG: 25 TABLET, FILM COATED ORAL at 09:23

## 2019-01-29 NOTE — PROGRESS NOTE
Subjective





- Reason for Consult


Consult date: 01/29/19


Reason for consult: Psychiatry Follow-up





- Chief Complaint


Chief complaint: 


"Can I go home"





43-year-old AA female that presented to the ER for decreased responsiveness and 

being lethargic. Psychiatry was consulted to see the patient. Today the patient 

is lethargic during the assessment. She is adamant about going home. She was 

asked about being suicidal, she would not answer. I the provider explained to 

her that her medications will be modified, she agreed. No gestures of HI's.  








Mental Status Exam





- Vital signs


                                Last Vital Signs











Temp  98.6 F   01/29/19 08:03


 


Pulse  84   01/29/19 08:03


 


Resp  18   01/29/19 08:03


 


BP  109/73   01/29/19 08:03


 


Pulse Ox  98   01/29/19 08:03














- Exam


Narrative exam: 


MSE:





 Appearance: calm      


 Behavior: regular eye contact


 Speech: regular rate and tone


 Mood: "not good"


 Affect: flat


 Thought Process: circumstantial          


 Thought Content: denies HI's and AVH's 


 Motor Activity: ambulatory


 Cognition: A/O x3 


 Insight: fair 


 Judgment: variable  











Assessment and Plan


Impression: Hx of Bipolar DO/Anxiety DO. PTSD. Today the patient is lethargic 

during the assessment. The patient positive for Methadone. 





Recommendation/Plan: Continue 1013 and continue Lamictal 200 mg PO HS for mood, 

Depakote 500 mg PO daily for mood, and Seroquel 200 mg PO HS for mood. Modified 

Klonopin to 1 mg PO HS for anxiety and Hydroxyzine to 25 mg PO Q6hrs PRN for 

acute anxiety. Discussed possible metabolic side effects of Seroquel with the 

patient. Discussed possible suicidality/medication induced marcial with the 

patient reference Trazodone. Discussed possible SJS with the patient reference 

Lamictal, she want to continue taking the medication. She denies that she has 

had more than a 2 day break from taking Lamictal prior to her admission to the 

hospital.   





Dispo: The patient will be referred to inpatient psy services once medically 

clear. 





Will staff with Dr JESUS King.

## 2019-01-29 NOTE — PROGRESS NOTE
Assessment and Plan


N/V with constipation


- place on PPI, get abdominal xry





Acute toxic encephalopathy


- likely from drug overdose 


- psych following





Pseudoseizure


- episode on 01/27/19 am is not typical for partial seizure ( symptom improved 

with D50 ?) 


- will get EEG when pt can cooperates, cont to monitor for now





Bipolar with PTSD


- resumed home meds and being adjusted now, appreciate psych recommendation





obesity, will  when more communicative








Chronic pain syndrome


- verified home meds with pharmacy, will resume home regimen 








DVt Px, lovenox





Brief History: 43-year-old AA female with h/o bipolar, PTSD, chronic pain 

presented to the ER for decreased responsiveness and being lethargic.





Physical exam:





General appearance: Present: well-nourished, other (tearful, depressed)





- EENT


Eyes: PERRL, EOM intact


ENT: hearing intact, clear oral mucosa


Ears: bilateral: normal





- Neck


Neck: supple, normal ROM





- Respiratory


Respiratory effort: normal


Respiratory: bilateral: CTA





- Cardiovascular


Rhythm: regular


Heart Sounds: Present: S1 & S2.  Absent: gallop, rub


Extremities: pulses intact, No edema, normal color, Full ROM





- Gastrointestinal


General gastrointestinal: Present: soft, non-tender, non-distended, normal bowel

sounds





- Integumentary


Integumentary: clear, warm, dry





- Musculoskeletal


Musculoskeletal: 1, strength equal bilaterally





- Neurologic


Neurologic: moves all extremities





- Psychiatric


Psychiatric: no appropriate mood/affect, other (falt affect)








Subjective


Date of service: 01/29/19


Interval history: 





Pt seen and examined


c/o N/V with poor appetite


No abdominal pain, had BM yesterday





Objective





- Constitutional


Vitals: 


                               Vital Signs - 12hr











  01/29/19 01/29/19 01/29/19





  04:00 05:31 08:03


 


Temperature  98.6 F 98.6 F


 


Pulse Rate 71 64 84


 


Respiratory  16 18





Rate   


 


Blood Pressure  109/66 109/73


 


O2 Sat by Pulse  99 98





Oximetry   














  01/29/19





  12:06


 


Temperature 98.4 F


 


Pulse Rate 67


 


Respiratory 18





Rate 


 


Blood Pressure 115/73


 


O2 Sat by Pulse 99





Oximetry 














- Labs


CBC & Chem 7: 


                                 01/26/19 18:56





                                 01/26/19 18:56

## 2019-01-29 NOTE — XRAY REPORT
FINAL REPORT



PROCEDURE:  XR ABDOMEN 1V AP



TECHNIQUE:  Single AP view of the abdomen



HISTORY:  n/v 



COMPARISON:  No prior studies are available for comparison.



FINDINGS:  

There is a single mildly dilated loop of air-filled bowel in the left abdomen, which is nonspecific. 
Stool and air are seen throughout the colon. Gastric band is present. No definitive evidence of small
 bowel obstruction. Postsurgical changes of the lumbar spine.



IMPRESSION:  

No definitive evidence of bowel obstruction. If symptoms persist or worsen, follow-up could be obtain
ed to evaluate for developing obstruction

## 2019-01-30 RX ADMIN — GABAPENTIN SCH MG: 400 CAPSULE ORAL at 22:43

## 2019-01-30 RX ADMIN — OXYCODONE AND ACETAMINOPHEN PRN TAB: 5; 325 TABLET ORAL at 13:01

## 2019-01-30 RX ADMIN — GABAPENTIN SCH MG: 400 CAPSULE ORAL at 13:01

## 2019-01-30 RX ADMIN — PANTOPRAZOLE SODIUM SCH MG: 40 TABLET, DELAYED RELEASE ORAL at 09:20

## 2019-01-30 RX ADMIN — GABAPENTIN SCH MG: 400 CAPSULE ORAL at 09:20

## 2019-01-30 RX ADMIN — ONDANSETRON PRN MG: 2 INJECTION INTRAMUSCULAR; INTRAVENOUS at 20:13

## 2019-01-30 RX ADMIN — HEPARIN SODIUM SCH UNIT: 5000 INJECTION, SOLUTION INTRAVENOUS; SUBCUTANEOUS at 09:22

## 2019-01-30 RX ADMIN — HEPARIN SODIUM SCH UNIT: 5000 INJECTION, SOLUTION INTRAVENOUS; SUBCUTANEOUS at 22:46

## 2019-01-30 RX ADMIN — ONDANSETRON PRN MG: 2 INJECTION INTRAMUSCULAR; INTRAVENOUS at 09:30

## 2019-01-30 RX ADMIN — AMLODIPINE BESYLATE SCH MG: 5 TABLET ORAL at 09:19
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
## 2019-01-30 NOTE — PROGRESS NOTE
Assessment and Plan


N/V could be from constipation


- placed on PPI, normal abdominal xry


- having regular BM, cont stool softner





Acute toxic encephalopathy


- likely from drug overdose 


- psych following





Pseudoseizure


- episode on 01/27/19 am is not typical for partial seizure ( symptom improved 

with D50 ?) 


- cont to monitor for now





Bipolar with PTSD


- resumed home meds and being adjusted now, appreciate psych recommendation





obesity, will  when more communicative








Chronic pain syndrome


- verified home meds with pharmacy,  resumed home regimen 








DVt Px, lovenox





Disposition; Medically stable, wait for psych clearance





Brief History: 43-year-old AA female with h/o bipolar, PTSD, chronic pain 

presented to the ER for decreased responsiveness and being lethargic.





Physical exam:





General appearance: Present: well-nourished, other (tearful, depressed)





- EENT


Eyes: PERRL, EOM intact


ENT: hearing intact, clear oral mucosa


Ears: bilateral: normal





- Neck


Neck: supple, normal ROM





- Respiratory


Respiratory effort: normal


Respiratory: bilateral: CTA





- Cardiovascular


Rhythm: regular


Heart Sounds: Present: S1 & S2.  Absent: gallop, rub


Extremities: pulses intact, No edema, normal color, Full ROM





- Gastrointestinal


General gastrointestinal: Present: soft, non-tender, non-distended, normal bowel

sounds





- Integumentary


Integumentary: clear, warm, dry





- Musculoskeletal


Musculoskeletal: 1, strength equal bilaterally





- Neurologic


Neurologic: moves all extremities





- Psychiatric


Psychiatric: no appropriate mood/affect, other (falt affect)








Subjective


Date of service: 01/30/19


Interval history: 





Pt seen and examined


c/o poor appetite


No abdominal pain, no n/v today 





Objective





- Constitutional


Vitals: 


                               Vital Signs - 12hr











  01/30/19 01/30/19 01/30/19





  08:23 09:19 12:30


 


Temperature 98.3 F  98.9 F


 


Pulse Rate 69 69 73


 


Respiratory 18  18





Rate   


 


Blood Pressure 107/64 107/64 126/84


 


O2 Sat by Pulse 97  97





Oximetry   














- Labs


CBC & Chem 7: 


                                 01/26/19 18:56





                                 01/26/19 18:56

## 2019-01-31 RX ADMIN — GABAPENTIN SCH MG: 400 CAPSULE ORAL at 21:51

## 2019-01-31 RX ADMIN — GABAPENTIN SCH: 400 CAPSULE ORAL at 15:00

## 2019-01-31 RX ADMIN — ONDANSETRON PRN MG: 2 INJECTION INTRAMUSCULAR; INTRAVENOUS at 16:25

## 2019-01-31 RX ADMIN — OXYCODONE AND ACETAMINOPHEN PRN TAB: 5; 325 TABLET ORAL at 10:09

## 2019-01-31 RX ADMIN — GABAPENTIN SCH MG: 400 CAPSULE ORAL at 07:58

## 2019-01-31 RX ADMIN — AMLODIPINE BESYLATE SCH MG: 5 TABLET ORAL at 10:14

## 2019-01-31 RX ADMIN — HEPARIN SODIUM SCH UNIT: 5000 INJECTION, SOLUTION INTRAVENOUS; SUBCUTANEOUS at 21:52

## 2019-01-31 RX ADMIN — HEPARIN SODIUM SCH UNIT: 5000 INJECTION, SOLUTION INTRAVENOUS; SUBCUTANEOUS at 10:12

## 2019-01-31 RX ADMIN — PANTOPRAZOLE SODIUM SCH MG: 40 TABLET, DELAYED RELEASE ORAL at 10:12

## 2019-01-31 NOTE — PROGRESS NOTE
Subjective





- Reason for Consult


Consult date: 01/31/19


Reason for consult: Psychiatry Follow-up





- Chief Complaint


Chief complaint: 


"I just want to go home'"





43-year-old AA female that presented to the ER for decreased responsiveness and 

being lethargic. Psychiatry was consulted to see the patient. Today the patient 

is calm during the assessment. She is more alert today. She is adamant about 

wanting to go home. Also, she want all her medications to be increased so she do

have to feel "mental pain" per the patient. She stated, "I just want everything 

to go away." She would not confirm or deny SI's when asked. She denies HI's and 

AVH's. She denies any side effects of her medications. 














Mental Status Exam





- Vital signs


                                Last Vital Signs











Temp  98.4 F   01/31/19 04:30


 


Pulse  72   01/31/19 10:14


 


Resp  18   01/31/19 04:30


 


BP  127/86   01/31/19 10:14


 


Pulse Ox  99   01/30/19 23:10














- Exam


Narrative exam: 


MSE:





 Appearance: calm    


 Behavior: regular eye contact


 Speech: regular rate and tone


 Mood: "okay"


 Affect: flat


 Thought Process: circumstantial          


 Thought Content: denies HI's and AVH's 


 Motor Activity: ambulatory


 Cognition: A/O x3 


 Insight: poor 


 Judgment: poor 











Assessment and Plan


Impression: Hx of Bipolar DO/Anxiety DO. PTSD. Today the patient is calm during 

the assessment. The patient positive for Methadone. 





Recommendation/Plan: Continue 1013 and continue Lamictal 200 mg PO HS for mood, 

Seroquel 200 mg PO HS for mood, Klonopin to 1 mg PO HS for anxiety, Hydroxyzine 

to 25 mg PO Q6hrs PRN for acute anxiety, and Trazodone 50 mg PO HS PRN for 

sleep. Discussed possible metabolic side effects of Seroquel with the patient. 

Discussed possible suicidality/medication induced marcial with the patient 

reference Trazodone. Discussed possible SJS with the patient reference Lamictal,

she want to continue taking the medication. She denies that she has had more 

than a 2 day break from taking Lamictal prior to her admission to the hospital. 

 





Dispo: The patient will be referred to inpatient psy services once medically 

clear. 





Will staff with Dr Montero.

## 2019-01-31 NOTE — PROGRESS NOTE
Assessment and Plan


N/V could be from constipation


- placed on PPI, normal abdominal xry


- having regular BM, cont stool softner


- tolerating diet now





Acute toxic encephalopathy


- likely from drug overdose 


- psych following





Pseudoseizure


- episode on 01/27/19 am is not typical for partial seizure ( symptom improved 

with D50 ?) 


- cont to monitor for now





Bipolar with PTSD


- resumed home meds and being adjusted now, appreciate psych recommendation





obesity, will  when more communicative








Chronic pain syndrome


- verified home meds with pharmacy,  resumed home regimen 








DVt Px, lovenox





Disposition; Medically stable, wait for psych clearance





Brief History: 43-year-old AA female with h/o bipolar, PTSD, chronic pain 

presented to the ER for decreased responsiveness and being lethargic.





Physical exam:





General appearance: Present: well-nourished, other (tearful, depressed)





- EENT


Eyes: PERRL, EOM intact


ENT: hearing intact, clear oral mucosa


Ears: bilateral: normal





- Neck


Neck: supple, normal ROM





- Respiratory


Respiratory effort: normal


Respiratory: bilateral: CTA





- Cardiovascular


Rhythm: regular


Heart Sounds: Present: S1 & S2.  Absent: gallop, rub


Extremities: pulses intact, No edema, normal color, Full ROM





- Gastrointestinal


General gastrointestinal: Present: soft, non-tender, non-distended, normal bowel

sounds





- Integumentary


Integumentary: clear, warm, dry





- Musculoskeletal


Musculoskeletal: 1, strength equal bilaterally





- Neurologic


Neurologic: moves all extremities





- Psychiatric


Psychiatric: no appropriate mood/affect, other (flat affect)








Subjective


Date of service: 01/31/19


Interval history: 





Pt seen and examined


No abdominal pain, no n/v today, family at bedside





Objective





- Constitutional


Vitals: 


                               Vital Signs - 12hr











  01/31/19 01/31/19 01/31/19





  04:00 04:30 10:14


 


Temperature  98.4 F 


 


Pulse Rate 68  72


 


Respiratory  18 





Rate   


 


Blood Pressure  107/71 127/86














- Labs


CBC & Chem 7: 


                                 02/01/19 09:57





                                 02/01/19 09:57

## 2019-02-01 LAB
BUN SERPL-MCNC: 10 MG/DL (ref 7–17)
BUN/CREAT SERPL: 14 %
CALCIUM SERPL-MCNC: 8.8 MG/DL (ref 8.4–10.2)
HCT VFR BLD CALC: 38.3 % (ref 30.3–42.9)
HEMOLYSIS INDEX: 10
HGB BLD-MCNC: 12.4 GM/DL (ref 10.1–14.3)
MCHC RBC AUTO-ENTMCNC: 32 % (ref 30–34)
MCV RBC AUTO: 82 FL (ref 79–97)
PLATELET # BLD: 210 K/MM3 (ref 140–440)
RBC # BLD AUTO: 4.69 M/MM3 (ref 3.65–5.03)

## 2019-02-01 RX ADMIN — GABAPENTIN SCH MG: 400 CAPSULE ORAL at 20:16

## 2019-02-01 RX ADMIN — GABAPENTIN SCH MG: 400 CAPSULE ORAL at 14:19

## 2019-02-01 RX ADMIN — AMLODIPINE BESYLATE SCH MG: 5 TABLET ORAL at 10:39

## 2019-02-01 RX ADMIN — GABAPENTIN SCH MG: 400 CAPSULE ORAL at 08:23

## 2019-02-01 RX ADMIN — PANTOPRAZOLE SODIUM SCH MG: 40 TABLET, DELAYED RELEASE ORAL at 10:38

## 2019-02-01 RX ADMIN — HEPARIN SODIUM SCH UNIT: 5000 INJECTION, SOLUTION INTRAVENOUS; SUBCUTANEOUS at 10:37

## 2019-02-01 NOTE — PROGRESS NOTE
Subjective





- Reason for Consult


Consult date: 02/01/19


Reason for consult: Psychiatry Follow-up





- Chief Complaint


Chief complaint: 


"Let me go home'"





43-year-old AA female that presented to the ER for decreased responsiveness and 

being lethargic. Psychiatry was consulted to see the patient. Today the patient 

is irritated during the assessment. She is adamant that her medications need to 

be increased so she don't have to wake up. She would not confirm or deny SI's 

when asked. She stated, "You don't have any idea what I'm going through." She 

denies HI's and AVH's. No indications of side effects of her medications. 








Mental Status Exam





- Vital signs


                                Last Vital Signs











Temp  98.4 F   02/01/19 07:58


 


Pulse  78   02/01/19 12:00


 


Resp  20   02/01/19 07:58


 


BP  134/84   02/01/19 10:39


 


Pulse Ox  98   02/01/19 07:58














- Exam


Narrative exam: 


MSE:





 Appearance: in a hospital gown   


 Behavior: regular eye contact


 Speech: regular rate and tone


 Mood: irritated 


 Affect: congruent to mood 


 Thought Process: tangential           


 Thought Content: denies HI's and AVH's 


 Motor Activity: ambulatory


 Cognition: A/O x3 


 Insight: poor 


 Judgment: poor 























Assessment and Plan


Impression: Hx of Bipolar DO/Anxiety DO. PTSD. Today the patient is irritated 

during the assessment. The patient positive for Methadone. 





Recommendation/Plan: Continue 1013 and continue Lamictal 200 mg PO HS for mood, 

Seroquel 200 mg PO HS for mood, Klonopin to 1 mg PO HS for anxiety, Hydroxyzine 

to 25 mg PO Q6hrs PRN for acute anxiety, and Trazodone 50 mg PO HS PRN for 

sleep. Discussed possible metabolic side effects of Seroquel with the patient. 

Discussed possible suicidality/medication induced marcial with the patient 

reference Trazodone. Discussed possible SJS with the patient reference Lamictal,

she want to continue taking the medication. She denies that she has had more 

than a 2 day break from taking Lamictal prior to her admission to the hospital. 

 





Dispo: The patient wias accepted to [facility] for inpatient psy services. 







Will staff with Dr Montero.

## 2019-02-01 NOTE — DISCHARGE SUMMARY
Providers





- Providers


Date of Admission: 


01/27/19 04:09





Date of discharge: 02/01/19


Attending physician: 


AUSTIN REDD





                                        





01/27/19 11:24


Consult to Mental Health [CONS] Routine 


   Reason For Exam: possible drug overdose


   Place consult to:: mental health


   Notified:: psych


   Phone number called:: [phone]


   Was contact made?: Yes


   Time called:: 11:36











Primary care physician: 


SOTERO SEVERINO








Hospitalization


Reason for admission: AMS


Condition: Critical


Pertinent studies: 





CXR


Abdominal xry








Hospital course: 


Brief History: 


43-year-old AA female with h/o bipolar, PTSD, chronic pain presented to the ER 

for decreased responsiveness and being lethargic. Her UDS was positive for metha

done. She was placed on 1013, consulted psych, medically optimized then 

discharged to inpt psych service. 





Discharge diagnosis and management:


N/V could be from constipation


- placed on PPI, normal abdominal xry


- having regular BM with stool softner


- tolerating diet now





Acute toxic encephalopathy


- likely from drug overdose


- monitored clinically,mental status improved with supportive care 





Pseudoseizure


- episode on 01/27/19 am is not typical for partial seizure ( symptom improved 

with D50 ?) 


- monitored clinically





Bipolar with PTSD


- resumed home meds and adjusted, psych was consulted and recommended inpt psych

 placement


- Continued 1013 and placed on Lamictal 200 mg PO HS and Seroquel 200 mg PO HS 

for mood, Klonopin to 1 mg PO HS for anxiety, Hydroxyzine to 25 mg PO Q6hrs PRN 

for acute anxiety, and Trazodone 50 mg PO HS PRN for sleep. 


- The patient was accepted to [facility] for inpatient psy services.





obesity, likely from access calorie, diet and exercise recommendation








Chronic pain syndrome


- verified home meds with pharmacy,  resumed home regimen 








DVt Px, lovenox





Disposition; Medically stable for inpt psych placement





Physical exam:





General appearance: Present: well-nourished,  depressed





- EENT


Eyes: PERRL, EOM intact


ENT: hearing intact, clear oral mucosa


Ears: bilateral: normal





- Neck


Neck: supple, normal ROM





- Respiratory


Respiratory effort: normal


Respiratory: bilateral: CTA





- Cardiovascular


Rhythm: regular


Heart Sounds: Present: S1 & S2.  Absent: gallop, rub


Extremities: pulses intact, No edema, normal color, Full ROM





- Gastrointestinal


General gastrointestinal: Present: soft, non-tender, non-distended, normal bowel

 sounds





- Integumentary


Integumentary: clear, warm, dry





- Musculoskeletal


Musculoskeletal: 1, strength equal bilaterally





- Neurologic


Neurologic: moves all extremities





- Psychiatric


Psychiatric: no appropriate mood/affect, other (flat affect)











Disposition: [id number]/TX-[id number] HOSP/PSY UNIT


Time spent for discharge: 34 minutes





Core Measure Documentation





- Palliative Care


Palliative Care/ Comfort Measures: Not Applicable





- Core Measures


Any of the following diagnoses?: none





Exam





- Constitutional


Vitals: 


                                        











Temp Pulse Resp BP Pulse Ox


 


 98.4 F   78   20   134/84   98 


 


 02/01/19 07:58  02/01/19 12:00  02/01/19 07:58  02/01/19 10:39  02/01/19 07:58














Plan


Activity: advance as tolerated


Weight Bearing Status: Weight Bear as Tolerated


Diet: regular


Follow up with: 


SOTERO SEVERINO MD [Primary Care Provider] - 7 Days

## 2019-02-03 VITALS — DIASTOLIC BLOOD PRESSURE: 84 MMHG | SYSTOLIC BLOOD PRESSURE: 134 MMHG
